# Patient Record
Sex: MALE | Race: WHITE | NOT HISPANIC OR LATINO | Employment: OTHER | URBAN - METROPOLITAN AREA
[De-identification: names, ages, dates, MRNs, and addresses within clinical notes are randomized per-mention and may not be internally consistent; named-entity substitution may affect disease eponyms.]

---

## 2020-08-01 ENCOUNTER — HOSPITAL ENCOUNTER (EMERGENCY)
Facility: MEDICAL CENTER | Age: 70
End: 2020-08-02
Attending: EMERGENCY MEDICINE
Payer: COMMERCIAL

## 2020-08-01 VITALS
BODY MASS INDEX: 19.62 KG/M2 | HEART RATE: 73 BPM | SYSTOLIC BLOOD PRESSURE: 125 MMHG | DIASTOLIC BLOOD PRESSURE: 86 MMHG | OXYGEN SATURATION: 96 % | TEMPERATURE: 97.9 F | WEIGHT: 125 LBS | RESPIRATION RATE: 17 BRPM | HEIGHT: 67 IN

## 2020-08-01 DIAGNOSIS — S01.81XA FOREHEAD LACERATION, INITIAL ENCOUNTER: ICD-10-CM

## 2020-08-01 DIAGNOSIS — S50.02XA CONTUSION OF LEFT ELBOW, INITIAL ENCOUNTER: ICD-10-CM

## 2020-08-01 DIAGNOSIS — Z53.29 LEFT AGAINST MEDICAL ADVICE: ICD-10-CM

## 2020-08-01 PROCEDURE — 304217 HCHG IRRIGATION SYSTEM

## 2020-08-01 PROCEDURE — 304999 HCHG REPAIR-SIMPLE/INTERMED LEVEL 1

## 2020-08-01 PROCEDURE — 303747 HCHG EXTRA SUTURE

## 2020-08-01 PROCEDURE — 700101 HCHG RX REV CODE 250: Performed by: EMERGENCY MEDICINE

## 2020-08-01 PROCEDURE — 99281 EMR DPT VST MAYX REQ PHY/QHP: CPT

## 2020-08-01 RX ORDER — LIDOCAINE HYDROCHLORIDE AND EPINEPHRINE BITARTRATE 20; .01 MG/ML; MG/ML
10 INJECTION, SOLUTION SUBCUTANEOUS ONCE
Status: COMPLETED | OUTPATIENT
Start: 2020-08-01 | End: 2020-08-01

## 2020-08-01 RX ADMIN — LIDOCAINE HYDROCHLORIDE AND EPINEPHRINE 10 ML: 20; 10 INJECTION, SOLUTION INFILTRATION; PERINEURAL at 23:45

## 2020-08-01 SDOH — HEALTH STABILITY: MENTAL HEALTH: HOW OFTEN DO YOU HAVE A DRINK CONTAINING ALCOHOL?: 4 OR MORE TIMES A WEEK

## 2020-08-01 SDOH — HEALTH STABILITY: MENTAL HEALTH: HOW MANY STANDARD DRINKS CONTAINING ALCOHOL DO YOU HAVE ON A TYPICAL DAY?: 1 OR 2

## 2020-08-02 ENCOUNTER — APPOINTMENT (OUTPATIENT)
Dept: RADIOLOGY | Facility: MEDICAL CENTER | Age: 70
End: 2020-08-02
Attending: EMERGENCY MEDICINE

## 2020-08-02 NOTE — ED PROVIDER NOTES
"ED Provider Note    CHIEF COMPLAINT  Chief Complaint   Patient presents with   • T-5000 GLF     \"I was walking home and I tripped and fell on a street sign. I fell right on the corner of the curb and hit my forehead.\" Laceration to l. eyebrow, bleeding controlled. Pt denies blood thinners. Pt denies LOC. Pt reports he has a beer a night before bed, but today he had \"a couple.\" Pt GCS 15. Neuro intact.         HPI  George Curiel is a 69 y.o. male who presents for evaluation of right-sided head pain and laceration.  Patient states he was walking home and tripped over a street sign.  His right forehead hit the curb.  He states he did not lose consciousness and does not have any neck pain.  He did note that he bruised his left elbow but states it feels \"fine\" and he can move it without difficulty.    REVIEW OF SYSTEMS  Constitutional: No recent fevers or chills  Skin: Bruising left elbow, laceration right eyebrow/forehead  HEENT: No recent sore throat or runny nose  Neck: No neck pain, stiffness, or masses.  Chest: No pain   Pulm: No shortness of breath or cough  Gastrointestinal: No nausea, vomiting, or pain.  Musculoskeletal: Mild pain and bruising to left elbow.  Neurologic: No sensory or motor changes. No confusion or disorientation.  Heme: No bleeding or bruising problems.   Immuno: No hx of recurrent infections      PAST MEDICAL HISTORY   has a past medical history of Depression and Psychiatric disorder.    SOCIAL HISTORY  Social History     Tobacco Use   • Smoking status: Light Tobacco Smoker   • Smokeless tobacco: Never Used   Substance and Sexual Activity   • Alcohol use: Yes     Alcohol/week: 0.6 oz     Types: 1 Cans of beer per week     Frequency: 4 or more times a week     Drinks per session: 1 or 2   • Drug use: Yes     Comment: \"a little weed.\"   • Sexual activity: Not on file       SURGICAL HISTORY  patient denies any surgical history    CURRENT MEDICATIONS  Home Medications    **Home medications have not " "yet been reviewed for this encounter**         ALLERGIES  No Known Allergies    PHYSICAL EXAM  VITAL SIGNS: /86   Pulse 73   Temp 36.6 °C (97.9 °F) (Temporal)   Resp 17   Ht 1.702 m (5' 7\")   Wt 56.7 kg (125 lb)   SpO2 96%   BMI 19.58 kg/m²    Gen: Alert in no apparent distress.  Smiling, conversant  HEENT: 3 cm laceration noted over right eyebrow.  PERRLA, EOMI.  Bilateral external ears normal, Nose normal. Conjunctiva normal, Non-icteric.   Neck:  No tenderness, Supple, No masses  Lymphatic: No cervical lymphadenopathy noted.   Cardiovascular: Regular rate and rhythm, no murmurs.  Capillary refill less than 3 seconds to all extremities, 2+ distal pulses.  Thorax & Lungs: Normal breath sounds, No respiratory distress, No wheezing bilateral chest rise  Abdomen: Bowel sounds normal, Soft, No tenderness, No masses, No pulsatile masses. No Guarding or rebound  Skin: Warm, Dry, No erythema  Back: No bony tenderness, No CVA tenderness.   Extremities: Intact distal pulses, No edema.  Feet bruising noted to left elbow lateral epicondyle.  Patient able to range elbow, shoulder, and wrist on affected side without distress or limitation.  Neurologic: Alert , no facial droop, grossly normal coordination and strength  Psychiatric: Affect pleasant      RADIOLOGY  No orders to display   Laceration Repair Procedure Note    Indication: Laceration    Procedure: The patient was placed in the appropriate position and anesthesia around the laceration was obtained by infiltration using 2.0 cc of 1% Lidocaine with epinephrine. The area was then irrigated with normal saline. The laceration was closed with 5-0 Prolene using interrupted sutures. There were no additional lacerations requiring repair. The wound area was then dressed with a sterile dressing.      Total repaired wound length: 3 cm.     Other Items: Suture count: 9    The patient tolerated the procedure well.    Complications: None      COURSE & MEDICAL DECISION " "MAKING  Patient arrives for evaluation after ground-level fall.  He had apparently been drinking as well but did not appear grossly intoxicated on my exam.  He had no gross focal neurologic symptoms or deficits and his laceration was repaired as described above.  Patient tolerated this well but then apparently left without his CT scan.  I was not informed by nursing of the patient's decision to leave.  Given his condition on my initial exam I do not feel it is necessary to track him down and have him return for a scan.  He was with his \"girlfriend\" who presumably went home with him and hopefully can watch him closely.  FINAL IMPRESSION  1. Forehead laceration, initial encounter    2. Contusion of left elbow, initial encounter    3. Left against medical advice        Electronically signed by: Joseph Mc M.D., 8/1/2020 11:25 PM  "

## 2020-08-02 NOTE — ED TRIAGE NOTES
"George Curiel  69 y.o. male    Chief Complaint   Patient presents with   • T-5000 GLF     \"I was walking home and I tripped and fell on a street sign. I fell right on the corner of the curb and hit my forehead.\" Laceration to l. eyebrow, bleeding controlled. Pt denies blood thinners. Pt denies LOC. Pt reports he has a beer a night before bed, but today he had \"a couple.\" Pt GCS 15. Neuro intact.       Pt ambulatory to triage with steady gait for above complaint.     Pt is alert and oriented, speaking in full sentences, follows commands and responds appropriately to questions. Resp are even and unlabored. No behavioral indicators of pain.   Pt placed in lobby. Pt educated on triage process. Pt encouraged to alert staff for any changes.    "

## 2020-08-09 ENCOUNTER — HOSPITAL ENCOUNTER (EMERGENCY)
Facility: MEDICAL CENTER | Age: 70
End: 2020-08-09
Attending: EMERGENCY MEDICINE
Payer: COMMERCIAL

## 2020-08-09 VITALS
OXYGEN SATURATION: 96 % | BODY MASS INDEX: 20.09 KG/M2 | RESPIRATION RATE: 14 BRPM | SYSTOLIC BLOOD PRESSURE: 118 MMHG | DIASTOLIC BLOOD PRESSURE: 80 MMHG | TEMPERATURE: 97.9 F | HEART RATE: 94 BPM | HEIGHT: 66 IN | WEIGHT: 125 LBS

## 2020-08-09 PROCEDURE — 302449 STATCHG TRIAGE ONLY (STATISTIC)

## 2020-08-09 PROCEDURE — 99281 EMR DPT VST MAYX REQ PHY/QHP: CPT

## 2020-08-10 NOTE — ED TRIAGE NOTES
George Curiel  69 y.o.  Chief Complaint   Patient presents with   • Suture Removal     L eyebrow, placed 8/1/2020     Ambulatory to triage with steady gait for above. A & O x 4, GCS 15.    Sutures clean, dry, intact - open to air. Scabbing noted to suture line. No drainage noted.    Triage process explained to patient, apologized for wait time, and returned to lobby.

## 2020-08-10 NOTE — ED NOTES
Assist RN: 9 sutures removed. Wound is well approximated at healing. Pt to be Dismissed form epic as he did not an an ERP, but just had sutures removed per protocol.

## 2021-01-15 DIAGNOSIS — Z23 NEED FOR VACCINATION: ICD-10-CM
